# Patient Record
Sex: MALE | Race: WHITE | Employment: OTHER | URBAN - METROPOLITAN AREA
[De-identification: names, ages, dates, MRNs, and addresses within clinical notes are randomized per-mention and may not be internally consistent; named-entity substitution may affect disease eponyms.]

---

## 2017-04-19 PROBLEM — E78.2 MIXED HYPERLIPIDEMIA: Status: ACTIVE | Noted: 2017-04-19

## 2017-04-19 PROBLEM — I25.10 CORONARY ARTERY DISEASE INVOLVING NATIVE CORONARY ARTERY OF NATIVE HEART WITHOUT ANGINA PECTORIS: Status: ACTIVE | Noted: 2017-04-19

## 2017-04-19 PROBLEM — I10 ESSENTIAL HYPERTENSION: Status: ACTIVE | Noted: 2017-04-19

## 2017-10-11 PROBLEM — N52.9 ED (ERECTILE DYSFUNCTION): Status: ACTIVE | Noted: 2017-10-11

## 2019-11-20 PROBLEM — I65.22 STENOSIS OF LEFT CAROTID ARTERY: Status: ACTIVE | Noted: 2019-11-20

## 2020-01-15 ENCOUNTER — HOSPITAL ENCOUNTER (OUTPATIENT)
Dept: CV DIAGNOSTICS | Facility: HOSPITAL | Age: 70
Discharge: HOME | End: 2020-01-15
Attending: SURGERY
Payer: MEDICARE

## 2020-01-15 ENCOUNTER — HOSPITAL ENCOUNTER (OUTPATIENT)
Dept: LAB | Facility: HOSPITAL | Age: 70
Discharge: HOME | End: 2020-01-15
Attending: SURGERY
Payer: MEDICARE

## 2020-01-15 DIAGNOSIS — Z01.818 PRE-OP TESTING: ICD-10-CM

## 2020-01-15 DIAGNOSIS — I65.29 CAROTID STENOSIS: ICD-10-CM

## 2020-01-15 LAB
ALBUMIN SERPL-MCNC: 3.9 G/DL (ref 3.4–5)
ALBUMIN/GLOB SERPL: 1.2 {RATIO} (ref 1–2)
ALP LIVER SERPL-CCNC: 66 U/L (ref 45–117)
ALT SERPL-CCNC: 24 U/L (ref 16–61)
ANION GAP SERPL CALC-SCNC: 4 MMOL/L (ref 0–18)
ANTIBODY SCREEN: NEGATIVE
APTT PPP: 26.5 SECONDS (ref 25.4–36.1)
AST SERPL-CCNC: 18 U/L (ref 15–37)
ATRIAL RATE: 66 BPM
BASOPHILS # BLD AUTO: 0.05 X10(3) UL (ref 0–0.2)
BASOPHILS NFR BLD AUTO: 0.8 %
BILIRUB SERPL-MCNC: 0.8 MG/DL (ref 0.1–2)
BUN BLD-MCNC: 22 MG/DL (ref 7–18)
BUN/CREAT SERPL: 17.6 (ref 10–20)
CALCIUM BLD-MCNC: 9 MG/DL (ref 8.5–10.1)
CHLORIDE SERPL-SCNC: 108 MMOL/L (ref 98–112)
CO2 SERPL-SCNC: 29 MMOL/L (ref 21–32)
CREAT BLD-MCNC: 1.25 MG/DL (ref 0.7–1.3)
DEPRECATED RDW RBC AUTO: 42.9 FL (ref 35.1–46.3)
EOSINOPHIL # BLD AUTO: 0.2 X10(3) UL (ref 0–0.7)
EOSINOPHIL NFR BLD AUTO: 3.1 %
ERYTHROCYTE [DISTWIDTH] IN BLOOD BY AUTOMATED COUNT: 12.3 % (ref 11–15)
GLOBULIN PLAS-MCNC: 3.2 G/DL (ref 2.8–4.4)
GLUCOSE BLD-MCNC: 100 MG/DL (ref 70–99)
HCT VFR BLD AUTO: 40.1 % (ref 39–53)
HGB BLD-MCNC: 13.2 G/DL (ref 13–17.5)
IMM GRANULOCYTES # BLD AUTO: 0.02 X10(3) UL (ref 0–1)
IMM GRANULOCYTES NFR BLD: 0.3 %
INR BLD: 0.96 (ref 0.9–1.1)
LYMPHOCYTES # BLD AUTO: 0.96 X10(3) UL (ref 1–4)
LYMPHOCYTES NFR BLD AUTO: 15.1 %
M PROTEIN MFR SERPL ELPH: 7.1 G/DL (ref 6.4–8.2)
MCH RBC QN AUTO: 31.6 PG (ref 26–34)
MCHC RBC AUTO-ENTMCNC: 32.9 G/DL (ref 31–37)
MCV RBC AUTO: 95.9 FL (ref 80–100)
MONOCYTES # BLD AUTO: 0.48 X10(3) UL (ref 0.1–1)
MONOCYTES NFR BLD AUTO: 7.6 %
NEUTROPHILS # BLD AUTO: 4.64 X10 (3) UL (ref 1.5–7.7)
NEUTROPHILS # BLD AUTO: 4.64 X10(3) UL (ref 1.5–7.7)
NEUTROPHILS NFR BLD AUTO: 73.1 %
OSMOLALITY SERPL CALC.SUM OF ELEC: 295 MOSM/KG (ref 275–295)
P AXIS: 20 DEGREES
P-R INTERVAL: 146 MS
PATIENT FASTING Y/N/NP: NO
PLATELET # BLD AUTO: 190 10(3)UL (ref 150–450)
POTASSIUM SERPL-SCNC: 4.2 MMOL/L (ref 3.5–5.1)
PSA SERPL DL<=0.01 NG/ML-MCNC: 13.2 SECONDS (ref 12.5–14.7)
Q-T INTERVAL: 388 MS
QRS DURATION: 94 MS
QTC CALCULATION (BEZET): 406 MS
R AXIS: -7 DEGREES
RBC # BLD AUTO: 4.18 X10(6)UL (ref 3.8–5.8)
RH BLOOD TYPE: POSITIVE
SODIUM SERPL-SCNC: 141 MMOL/L (ref 136–145)
T AXIS: 19 DEGREES
VENTRICULAR RATE: 66 BPM
WBC # BLD AUTO: 6.4 X10(3) UL (ref 4–11)

## 2020-01-15 PROCEDURE — 85025 COMPLETE CBC W/AUTO DIFF WBC: CPT | Performed by: SURGERY

## 2020-01-15 PROCEDURE — 93005 ELECTROCARDIOGRAM TRACING: CPT

## 2020-01-15 PROCEDURE — 80053 COMPREHEN METABOLIC PANEL: CPT | Performed by: SURGERY

## 2020-01-15 PROCEDURE — 86900 BLOOD TYPING SEROLOGIC ABO: CPT | Performed by: SURGERY

## 2020-01-15 PROCEDURE — 36415 COLL VENOUS BLD VENIPUNCTURE: CPT | Performed by: SURGERY

## 2020-01-15 PROCEDURE — 85610 PROTHROMBIN TIME: CPT | Performed by: SURGERY

## 2020-01-15 PROCEDURE — 86901 BLOOD TYPING SEROLOGIC RH(D): CPT | Performed by: SURGERY

## 2020-01-15 PROCEDURE — 86850 RBC ANTIBODY SCREEN: CPT | Performed by: SURGERY

## 2020-01-15 PROCEDURE — 93010 ELECTROCARDIOGRAM REPORT: CPT | Performed by: INTERNAL MEDICINE

## 2020-01-15 PROCEDURE — 85730 THROMBOPLASTIN TIME PARTIAL: CPT | Performed by: SURGERY

## 2020-01-27 RX ORDER — CLOPIDOGREL BISULFATE 75 MG/1
75 TABLET ORAL DAILY
COMMUNITY
End: 2020-02-25

## 2020-01-28 ENCOUNTER — ANESTHESIA EVENT (OUTPATIENT)
Dept: CARDIAC SURGERY | Facility: HOSPITAL | Age: 70
DRG: 036 | End: 2020-01-28
Payer: MEDICARE

## 2020-01-29 ENCOUNTER — ANESTHESIA (OUTPATIENT)
Dept: CARDIAC SURGERY | Facility: HOSPITAL | Age: 70
DRG: 036 | End: 2020-01-29
Payer: MEDICARE

## 2020-01-29 ENCOUNTER — HOSPITAL ENCOUNTER (INPATIENT)
Facility: HOSPITAL | Age: 70
LOS: 1 days | Discharge: HOME | DRG: 036 | End: 2020-01-30
Attending: SURGERY | Admitting: SURGERY
Payer: MEDICARE

## 2020-01-29 PROBLEM — I65.22 LEFT CAROTID ARTERY STENOSIS: Status: ACTIVE | Noted: 2020-01-29

## 2020-01-29 LAB
APTT PPP: 38.8 SECONDS (ref 25.4–36.1)
DEPRECATED RDW RBC AUTO: 42.2 FL (ref 35.1–46.3)
ERYTHROCYTE [DISTWIDTH] IN BLOOD BY AUTOMATED COUNT: 12.1 % (ref 11–15)
GLUCOSE BLD-MCNC: 114 MG/DL (ref 70–99)
GLUCOSE BLD-MCNC: 118 MG/DL (ref 70–99)
HCT VFR BLD AUTO: 35.1 % (ref 39–53)
HGB BLD-MCNC: 11.7 G/DL (ref 13–17.5)
INR BLD: 1.09 (ref 0.9–1.1)
ISTAT ACTIVATED CLOTTING TIME: 120 SECONDS (ref 74–137)
ISTAT ACTIVATED CLOTTING TIME: 131 SECONDS (ref 74–137)
ISTAT ACTIVATED CLOTTING TIME: 285 SECONDS (ref 74–137)
ISTAT ACTIVATED CLOTTING TIME: 296 SECONDS (ref 74–137)
ISTAT BLOOD GAS BASE EXCESS: 0 MMOL/L (ref ?–30)
ISTAT BLOOD GAS HCO3: 25.7 MEQ/L (ref 22–26)
ISTAT BLOOD GAS O2 SATURATION: 98 % (ref 92–100)
ISTAT BLOOD GAS PCO2: 48.7 MMHG (ref 35–45)
ISTAT BLOOD GAS PH: 7.33 (ref 7.35–7.45)
ISTAT BLOOD GAS PO2: 106 MMHG (ref 80–105)
ISTAT BLOOD GAS TCO2: 27 MMOL/L (ref 22–32)
ISTAT HEMATOCRIT: 34 % (ref 37–53)
ISTAT IONIZED CALCIUM: 1.28 MMOL/L (ref 1.12–1.32)
ISTAT POTASSIUM: 4.2 MMOL/L (ref 3.6–5.1)
ISTAT SODIUM: 141 MMOL/L (ref 136–145)
MCH RBC QN AUTO: 31.9 PG (ref 26–34)
MCHC RBC AUTO-ENTMCNC: 33.3 G/DL (ref 31–37)
MCV RBC AUTO: 95.6 FL (ref 80–100)
PLATELET # BLD AUTO: 183 10(3)UL (ref 150–450)
PSA SERPL DL<=0.01 NG/ML-MCNC: 14.6 SECONDS (ref 12.5–14.7)
RBC # BLD AUTO: 3.67 X10(6)UL (ref 3.8–5.8)
WBC # BLD AUTO: 7.2 X10(3) UL (ref 4–11)

## 2020-01-29 PROCEDURE — 87081 CULTURE SCREEN ONLY: CPT | Performed by: SURGERY

## 2020-01-29 PROCEDURE — 85014 HEMATOCRIT: CPT

## 2020-01-29 PROCEDURE — 84295 ASSAY OF SERUM SODIUM: CPT

## 2020-01-29 PROCEDURE — 85347 COAGULATION TIME ACTIVATED: CPT

## 2020-01-29 PROCEDURE — 82330 ASSAY OF CALCIUM: CPT

## 2020-01-29 PROCEDURE — 86920 COMPATIBILITY TEST SPIN: CPT

## 2020-01-29 PROCEDURE — 82803 BLOOD GASES ANY COMBINATION: CPT

## 2020-01-29 PROCEDURE — 85730 THROMBOPLASTIN TIME PARTIAL: CPT | Performed by: SURGERY

## 2020-01-29 PROCEDURE — 84132 ASSAY OF SERUM POTASSIUM: CPT

## 2020-01-29 PROCEDURE — 037L3DZ DILATION OF LEFT INTERNAL CAROTID ARTERY WITH INTRALUMINAL DEVICE, PERCUTANEOUS APPROACH: ICD-10-PCS | Performed by: SURGERY

## 2020-01-29 PROCEDURE — 51701 INSERT BLADDER CATHETER: CPT

## 2020-01-29 PROCEDURE — 85027 COMPLETE CBC AUTOMATED: CPT | Performed by: SURGERY

## 2020-01-29 PROCEDURE — 85610 PROTHROMBIN TIME: CPT | Performed by: SURGERY

## 2020-01-29 PROCEDURE — 82962 GLUCOSE BLOOD TEST: CPT

## 2020-01-29 DEVICE — IMPLANTABLE DEVICE: Type: IMPLANTABLE DEVICE | Site: NECK | Status: FUNCTIONAL

## 2020-01-29 RX ORDER — SODIUM CHLORIDE, SODIUM LACTATE, POTASSIUM CHLORIDE, CALCIUM CHLORIDE 600; 310; 30; 20 MG/100ML; MG/100ML; MG/100ML; MG/100ML
INJECTION, SOLUTION INTRAVENOUS CONTINUOUS
Status: DISCONTINUED | OUTPATIENT
Start: 2020-01-29 | End: 2020-01-30

## 2020-01-29 RX ORDER — ASPIRIN 81 MG/1
81 TABLET ORAL DAILY
Status: DISCONTINUED | OUTPATIENT
Start: 2020-01-29 | End: 2020-01-29

## 2020-01-29 RX ORDER — SODIUM CHLORIDE 9 MG/ML
INJECTION, SOLUTION INTRAVENOUS CONTINUOUS PRN
Status: DISCONTINUED | OUTPATIENT
Start: 2020-01-29 | End: 2020-01-29 | Stop reason: SURG

## 2020-01-29 RX ORDER — DOCUSATE SODIUM 100 MG/1
100 CAPSULE, LIQUID FILLED ORAL 2 TIMES DAILY
Status: DISCONTINUED | OUTPATIENT
Start: 2020-01-29 | End: 2020-01-30

## 2020-01-29 RX ORDER — NALOXONE HYDROCHLORIDE 0.4 MG/ML
80 INJECTION, SOLUTION INTRAMUSCULAR; INTRAVENOUS; SUBCUTANEOUS AS NEEDED
Status: ACTIVE | OUTPATIENT
Start: 2020-01-29 | End: 2020-01-29

## 2020-01-29 RX ORDER — METOPROLOL SUCCINATE 50 MG/1
50 TABLET, EXTENDED RELEASE ORAL
Status: DISCONTINUED | OUTPATIENT
Start: 2020-01-29 | End: 2020-01-30

## 2020-01-29 RX ORDER — HEPARIN SODIUM 1000 [USP'U]/ML
INJECTION, SOLUTION INTRAVENOUS; SUBCUTANEOUS AS NEEDED
Status: DISCONTINUED | OUTPATIENT
Start: 2020-01-29 | End: 2020-01-29 | Stop reason: SURG

## 2020-01-29 RX ORDER — MIDAZOLAM HYDROCHLORIDE 1 MG/ML
INJECTION INTRAMUSCULAR; INTRAVENOUS AS NEEDED
Status: DISCONTINUED | OUTPATIENT
Start: 2020-01-29 | End: 2020-01-29 | Stop reason: SURG

## 2020-01-29 RX ORDER — PANTOPRAZOLE SODIUM 20 MG/1
20 TABLET, DELAYED RELEASE ORAL DAILY
Status: DISCONTINUED | OUTPATIENT
Start: 2020-01-29 | End: 2020-01-29 | Stop reason: SDUPTHER

## 2020-01-29 RX ORDER — CLOPIDOGREL BISULFATE 75 MG/1
75 TABLET ORAL DAILY
Status: DISCONTINUED | OUTPATIENT
Start: 2020-01-29 | End: 2020-01-30

## 2020-01-29 RX ORDER — CLOPIDOGREL BISULFATE 75 MG/1
75 TABLET ORAL DAILY
Status: DISCONTINUED | OUTPATIENT
Start: 2020-01-29 | End: 2020-01-29

## 2020-01-29 RX ORDER — METOCLOPRAMIDE HYDROCHLORIDE 5 MG/ML
INJECTION INTRAMUSCULAR; INTRAVENOUS AS NEEDED
Status: DISCONTINUED | OUTPATIENT
Start: 2020-01-29 | End: 2020-01-29 | Stop reason: SURG

## 2020-01-29 RX ORDER — LIDOCAINE HYDROCHLORIDE 10 MG/ML
INJECTION, SOLUTION INFILTRATION; PERINEURAL AS NEEDED
Status: DISCONTINUED | OUTPATIENT
Start: 2020-01-29 | End: 2020-01-29 | Stop reason: HOSPADM

## 2020-01-29 RX ORDER — VALSARTAN 40 MG/1
40 TABLET ORAL
Status: DISCONTINUED | OUTPATIENT
Start: 2020-01-29 | End: 2020-01-30

## 2020-01-29 RX ORDER — TAMSULOSIN HYDROCHLORIDE 0.4 MG/1
0.4 CAPSULE ORAL DAILY
Status: DISCONTINUED | OUTPATIENT
Start: 2020-01-29 | End: 2020-01-30

## 2020-01-29 RX ORDER — ASPIRIN 325 MG
325 TABLET ORAL DAILY
Status: DISCONTINUED | OUTPATIENT
Start: 2020-01-30 | End: 2020-01-30

## 2020-01-29 RX ORDER — GLYCOPYRROLATE 0.2 MG/ML
INJECTION, SOLUTION INTRAMUSCULAR; INTRAVENOUS AS NEEDED
Status: DISCONTINUED | OUTPATIENT
Start: 2020-01-29 | End: 2020-01-29 | Stop reason: SURG

## 2020-01-29 RX ORDER — ONDANSETRON 2 MG/ML
4 INJECTION INTRAMUSCULAR; INTRAVENOUS AS NEEDED
Status: ACTIVE | OUTPATIENT
Start: 2020-01-29 | End: 2020-01-29

## 2020-01-29 RX ORDER — POLYETHYLENE GLYCOL 3350 17 G/17G
17 POWDER, FOR SOLUTION ORAL DAILY PRN
Status: DISCONTINUED | OUTPATIENT
Start: 2020-01-29 | End: 2020-01-30

## 2020-01-29 RX ORDER — CEFAZOLIN SODIUM/WATER 2 G/20 ML
SYRINGE (ML) INTRAVENOUS AS NEEDED
Status: DISCONTINUED | OUTPATIENT
Start: 2020-01-29 | End: 2020-01-29 | Stop reason: SURG

## 2020-01-29 RX ORDER — BISACODYL 10 MG
10 SUPPOSITORY, RECTAL RECTAL
Status: DISCONTINUED | OUTPATIENT
Start: 2020-01-29 | End: 2020-01-30

## 2020-01-29 RX ORDER — CEFAZOLIN SODIUM/WATER 2 G/20 ML
2 SYRINGE (ML) INTRAVENOUS EVERY 8 HOURS
Status: COMPLETED | OUTPATIENT
Start: 2020-01-29 | End: 2020-01-30

## 2020-01-29 RX ORDER — HEPARIN SODIUM 5000 [USP'U]/ML
5000 INJECTION, SOLUTION INTRAVENOUS; SUBCUTANEOUS ONCE
Status: DISCONTINUED | OUTPATIENT
Start: 2020-01-29 | End: 2020-01-29 | Stop reason: HOSPADM

## 2020-01-29 RX ORDER — HYDROMORPHONE HYDROCHLORIDE 1 MG/ML
0.4 INJECTION, SOLUTION INTRAMUSCULAR; INTRAVENOUS; SUBCUTANEOUS EVERY 5 MIN PRN
Status: ACTIVE | OUTPATIENT
Start: 2020-01-29 | End: 2020-01-29

## 2020-01-29 RX ORDER — EZETIMIBE 10 MG/1
10 TABLET ORAL NIGHTLY
Status: DISCONTINUED | OUTPATIENT
Start: 2020-01-29 | End: 2020-01-30

## 2020-01-29 RX ORDER — OMEPRAZOLE 20 MG/1
20 CAPSULE, DELAYED RELEASE ORAL
Status: DISCONTINUED | OUTPATIENT
Start: 2020-01-29 | End: 2020-01-30

## 2020-01-29 RX ORDER — ATORVASTATIN CALCIUM 20 MG/1
20 TABLET, FILM COATED ORAL NIGHTLY
Status: DISCONTINUED | OUTPATIENT
Start: 2020-01-29 | End: 2020-01-30

## 2020-01-29 RX ORDER — BUPIVACAINE HYDROCHLORIDE 5 MG/ML
INJECTION, SOLUTION EPIDURAL; INTRACAUDAL AS NEEDED
Status: DISCONTINUED | OUTPATIENT
Start: 2020-01-29 | End: 2020-01-29 | Stop reason: HOSPADM

## 2020-01-29 RX ORDER — ONDANSETRON 2 MG/ML
4 INJECTION INTRAMUSCULAR; INTRAVENOUS EVERY 6 HOURS PRN
Status: DISCONTINUED | OUTPATIENT
Start: 2020-01-29 | End: 2020-01-30

## 2020-01-29 RX ADMIN — METOCLOPRAMIDE HYDROCHLORIDE 40 MG: 5 INJECTION INTRAMUSCULAR; INTRAVENOUS at 09:23:00

## 2020-01-29 RX ADMIN — MIDAZOLAM HYDROCHLORIDE 2 MG: 1 INJECTION INTRAMUSCULAR; INTRAVENOUS at 07:22:00

## 2020-01-29 RX ADMIN — CEFAZOLIN SODIUM/WATER 2 G: 2 G/20 ML SYRINGE (ML) INTRAVENOUS at 07:45:00

## 2020-01-29 RX ADMIN — GLYCOPYRROLATE 0.2 MG: 0.2 INJECTION, SOLUTION INTRAMUSCULAR; INTRAVENOUS at 08:58:00

## 2020-01-29 RX ADMIN — HEPARIN SODIUM 12000 UNITS: 1000 INJECTION, SOLUTION INTRAVENOUS; SUBCUTANEOUS at 08:19:00

## 2020-01-29 RX ADMIN — HEPARIN SODIUM 2000 UNITS: 1000 INJECTION, SOLUTION INTRAVENOUS; SUBCUTANEOUS at 08:31:00

## 2020-01-29 RX ADMIN — SODIUM CHLORIDE: 9 INJECTION, SOLUTION INTRAVENOUS at 07:35:00

## 2020-01-29 NOTE — ANESTHESIA PREPROCEDURE EVALUATION
PRE-OP EVALUATION    Patient Name: Dodie Flynn    Pre-op Diagnosis: left carotid stenosis    Procedure(s):  left transcarotid artery revascularization  8x30 and 8x40 stents  no SA needed    Surgeon(s) and Role:     Telly Silver MD - Primary neuro/psych ROS.                           Tonsil cancer       Past Surgical History:   Procedure Laterality Date   • ANGIOGRAM  04/07/2017    Xience stent placed RCA   • ANGIOPLASTY (CORONARY)  04/07/2017 & 2001    stent placement   • CATH DRUG ELUTING NIKHIL neck           ASA: 3   Plan: general  NPO status verified and patient meets guidelines. Post-procedure pain management plan discussed with surgeon and patient.       Plan/risks discussed with: patient, spouse and child/children            A line   piv x

## 2020-01-29 NOTE — ANESTHESIA PROCEDURE NOTES
Arterial Line  Performed by: Celestino Schulte MD  Authorized by: Celestino Schulte MD     General Information and Staff    Procedure Start:  1/29/2020 7:24 AM  Procedure End:  1/29/2020 8:29 AM  Anesthesiologist:  Celestino Schulte MD  Perfo

## 2020-01-29 NOTE — H&P
[facility]    PATIENT'S NAME: Tyra Keith   ATTENDING PHYSICIAN: Maria Ines Senior M.D.    PATIENT ACCOUNT#:   [de-identified]    [facility]  MEDICAL RECORD #:   [id number]       YOB: 1950  ADMISSION DATE: and lisinopril. SOCIAL HISTORY:  He has a history of tobacco abuse in the past, but quit in the 1980s. He is currently retired, used to work for [employer]. He denies any EtOH abuse or drug use. The patient is .   He has a total of 5 childre him an open repair which may be difficult. I discussed stenting from a femoral artery approach which also may be difficult and possibly not obtainable due to the coiling of the artery beyond.   I reviewed and discussed this case with Dr. Tiesha Stevens and offer

## 2020-01-29 NOTE — OPERATIVE REPORT
Pre-op Dx: Left carotid stenosis/ Radiation to neck. Post-op Dx: same with coiling of ICA distal to stenosis. Procedure: TCAR laft carotid with open CCA/ Percutaneous right CFV/ PTA left ICA? CCA- 4x40. Left ICA/CCA Stent Enroute- 8x40. Surgeon : Ethan Brewer.  Pro

## 2020-01-29 NOTE — CONSULTS
[facility] Cardiology  Consultation Note    Lucas Camacho Patient Status:  Inpatient    1950 MRN [id number]   [facility] 6NE-A Attending Jordy Crawford MD   Hosp Day # 0 PCP Darlin Mueller MD     Reason for consult: reports that he quit smoking about 32 years ago. He has a 20.00 pack-year smoking history. He has never used smokeless tobacco. He reports that he does not drink alcohol or use drugs.     Allergies:    Hydrochlorothiazide     ITCHING  Lisinopril murmur or gallops     Abdomen: soft, nontender, nondistended, intact bowel sounds  Extremities: no calf tenderness, no pedal edema, intact pulses  Neurologic: no focal neurological deficits  Musculoskeletal: moves all 4 extremities, gait deferred   Psychia

## 2020-01-29 NOTE — CONSULTS
[facility] Hospitalist History and Physical      CC: medical management sp carotid surgery    PCP: Matt Perdomo MD      History of Present Illness: Patient is a 68year old male with PMH sig for CAD, carotid stenosis, HTN, HL, tonisillar ca sp chemo/xrt pre Alcohol use: No       Fam Hx  Family History   Problem Relation Age of Onset   • Heart Surgery Father         CABG and B/L CEA   • Other (Other) Mother        Review of Systems  Comprehensive ROS reviewed and negative except for what is stated in HPI. patient while in house  A total of 75  minutes taken with patient and coordinating care. Greater than 50% face to face encounter.       Remy Yao MD  [facility] Hospitalist  [phone]    **Certification      PHYSICIAN Certification of Need for Inpatient H

## 2020-01-29 NOTE — ANESTHESIA PROCEDURE NOTES
Peripheral IV  Date/Time: 1/29/2020 7:35 AM  Inserted by: Markel Joy MD    Placement  Needle size: 16 G  Laterality: left  Location: hand  Local anesthetic: injectable  Site prep: alcohol  Technique: anatomical landmarks  Attempts: 1

## 2020-01-29 NOTE — ANESTHESIA POSTPROCEDURE EVALUATION
[address]  Patient Status:  Inpatient   Age/Gender 68year old male MRN [id number]   [facility] 6NE-A Attending Ely Thornton MD   Hosp Day # 0 PCP [address] Mcdonough MD       Anesthesia Post-op Note    Procedure(s)

## 2020-01-30 VITALS
RESPIRATION RATE: 15 BRPM | TEMPERATURE: 98 F | SYSTOLIC BLOOD PRESSURE: 110 MMHG | HEIGHT: 68 IN | BODY MASS INDEX: 35.05 KG/M2 | OXYGEN SATURATION: 100 % | DIASTOLIC BLOOD PRESSURE: 55 MMHG | HEART RATE: 66 BPM | WEIGHT: 231.25 LBS

## 2020-01-30 LAB
ANION GAP SERPL CALC-SCNC: 7 MMOL/L (ref 0–18)
BUN BLD-MCNC: 23 MG/DL (ref 7–18)
BUN/CREAT SERPL: 21.7 (ref 10–20)
CALCIUM BLD-MCNC: 8.6 MG/DL (ref 8.5–10.1)
CHLORIDE SERPL-SCNC: 110 MMOL/L (ref 98–112)
CO2 SERPL-SCNC: 26 MMOL/L (ref 21–32)
CREAT BLD-MCNC: 1.06 MG/DL (ref 0.7–1.3)
DEPRECATED RDW RBC AUTO: 42.5 FL (ref 35.1–46.3)
ERYTHROCYTE [DISTWIDTH] IN BLOOD BY AUTOMATED COUNT: 12.3 % (ref 11–15)
GLUCOSE BLD-MCNC: 120 MG/DL (ref 70–99)
HCT VFR BLD AUTO: 35.1 % (ref 39–53)
HGB BLD-MCNC: 11.6 G/DL (ref 13–17.5)
MCH RBC QN AUTO: 31.5 PG (ref 26–34)
MCHC RBC AUTO-ENTMCNC: 33 G/DL (ref 31–37)
MCV RBC AUTO: 95.4 FL (ref 80–100)
OSMOLALITY SERPL CALC.SUM OF ELEC: 301 MOSM/KG (ref 275–295)
PLATELET # BLD AUTO: 157 10(3)UL (ref 150–450)
POTASSIUM SERPL-SCNC: 4.5 MMOL/L (ref 3.5–5.1)
RBC # BLD AUTO: 3.68 X10(6)UL (ref 3.8–5.8)
SODIUM SERPL-SCNC: 143 MMOL/L (ref 136–145)
WBC # BLD AUTO: 7.5 X10(3) UL (ref 4–11)

## 2020-01-30 PROCEDURE — 85027 COMPLETE CBC AUTOMATED: CPT | Performed by: SURGERY

## 2020-01-30 PROCEDURE — 80048 BASIC METABOLIC PNL TOTAL CA: CPT | Performed by: SURGERY

## 2020-01-30 NOTE — PROGRESS NOTES
[facility] [address] [place] [address] Cardiology Progress Note        Rona Persaud Patient Status:  Inpatient    1950 MRN [id number]   [facility] 6NE-A Attending Mirella Aguirre MD   Hosp Day # 1 PCP Ursula Norwood 7.2 7.5   HGB 11.7* 11.6*   .0 157.0       Chem:  Recent Labs   Lab 01/30/20  0347      K 4.5      CO2 26.0   BUN 23*   CREATSERUM 1.06   CA 8.6   *       No results for input(s): ALT, AST, ALB, AMYLASE, LIPASE, LDH in the last 16

## 2020-01-30 NOTE — PLAN OF CARE
Assumed care of patient at 0700. Patient up in chair. A/O X 4. Neuros intact. NSR on monitor. Maintains sbp  per orders. Right groin site c/d/I, soft and free of hematoma. Pulses palpable. Neck incision with skin glue c/d/I., open to air.  Okay to d/c

## 2020-01-30 NOTE — PROGRESS NOTES
Vascular surgery progress note    Patient seen and examined this AM.  No overnight events. He denies any pain, numbness, tingling, weakness. His vitals and labs appear stable and within normal limits.   Lamination, his incision is clean, dry, intact with

## 2020-01-30 NOTE — CM/SW NOTE
01/30/20 1500   CM/SW Screening   Referral Source    Information Source Chart review   Patient's Mental Status Alert;Oriented   Patient lives with Spouse   Patient Status Prior to Admission   Independent with ADLs and Mobility Yes   [address]

## 2020-01-30 NOTE — PLAN OF CARE
Assumed care of pt at [facility]. Pt resting in bed, VSS. Right groin incision site soft, no hematoma, C/D/I. Left neck incision site C/D/I. Pt denies any pain/discomfort. Pt neurologically intact, up to bathroom with standby assist. Pt updated on plan of care.

## 2020-01-30 NOTE — OPERATIVE REPORT
[facility]    PATIENT'S NAME: Deysi Milner   ATTENDING PHYSICIAN: Gen Lobato M.D. OPERATING PHYSICIAN: Gen Lobato M.D.    PATIENT ACCOUNT#:   [de-identified]    LOCATION:  [address] [place] [address]  MEDICAL RECORD #:   [id number]       DATE OF BIRTH: thrombosis, bleeding, and infection. The case had been reviewed and discussed also with Dr. Marcel Ruvalcaba who saw the patient. Patient and wife understood and agreed. All questions answered.     OPERATIVE TECHNIQUE:  The patient was placed supine in the proce on the table ready to be used as well as the 0.014 wire as well as a 4 x 40 mm [place] balloon catheter.     The patient, after ACTs were monitored to make sure they were appropriate, had puncture of the left common carotid artery at the base with a micropunct proper lesion spot. The reversal was stopped during the injection and immediately restarted afterward.     The 0.014 wire was placed and went through as a TCAR guidewire 95 cm, 0.014, was placed under fluoroscopy and was marked past the area of the stenosi for hemostasis. The right femoral vein access was removed with pressure applied and applied for at least 10 minutes with good hemostasis obtained. The patient had reversal of the anticoagulation with 40 mg slowly of protamine.   Neurologically, the patien balloon, and then placement of an 8 x 40 mm TCAR stent with angiograms of the left carotid artery. Dictated By Lacey Palomo M.D.  d: 01/29/2020 10:32:37  t: 01/29/2020 11:38:20  [facility] [id number]  [facility]/    cc: Lacey Palomo M.D. Darlene \[facility] [place] [facility]\""

## 2020-01-31 LAB — BLOOD TYPE BARCODE: 5100

## 2020-01-31 NOTE — PROGRESS NOTES
[facility] Hospitalist note    PCP: Juliana Guaman MD    Chief Complaint:  F/u carotid artery stenosis s/p L transcarotid revascularization and stent    SUBJECTIVE:  Pt feeling very well. Ambulating. No sob. No sig pain. OBJECTIVE:  Temp:  [97.8 °F (36.

## 2020-02-04 NOTE — CONSULTS
Vascular Surgery  New Patient Consultation    Name: Monalisa Goldberg  MRN: [id number]  : 1950    Referring Provider: No ref.  provider found    CC: left carotid stenosis    HPI: 77-year-old male with history of hypertension, hyperlipidemia, corona • TRANSCAROTID ARTERY REVASCULARIZATION Left 1/29/2020    Performed by Liana Dc MD at [facility] CVOR         Hydrochlorothiazide     ITCHING  Lisinopril              OTHER (SEE COMMENTS)    Comment:Sensitive to light  Losartan                SWELLING Constitutional: No fevers, chills, fatigue or night sweats. ENT: No mouth pain, neck pain, running nose, headaches or swollen glands. Skin: No rashes, pruritus or skin changes,  Respiratory: Denies cough, wheezing or shortness of breath.   CV: Denies ches Imaging: CTA: Imaging reviewed. See final report for further details.   He has a high-grade left carotid artery stenosis that is approximately an 90% stenosis that is focal.  This is within the internal carotid artery approximately 3 cm distal to the bifur

## 2020-02-19 DIAGNOSIS — Z98.890 HISTORY OF COMMON CAROTID ARTERY STENT PLACEMENT: Primary | ICD-10-CM

## 2020-02-19 DIAGNOSIS — C09.9 TONSILLAR CANCER (CMD): ICD-10-CM

## 2020-02-19 DIAGNOSIS — Z95.828 HISTORY OF COMMON CAROTID ARTERY STENT PLACEMENT: Primary | ICD-10-CM

## 2020-02-21 ENCOUNTER — HOSPITAL ENCOUNTER (OUTPATIENT)
Dept: CARDIOLOGY CLINIC | Facility: HOSPITAL | Age: 70
Discharge: HOME | End: 2020-02-21
Attending: SURGERY
Payer: MEDICARE

## 2020-02-21 DIAGNOSIS — Z98.890 HISTORY OF COMMON CAROTID ARTERY STENT PLACEMENT: ICD-10-CM

## 2020-02-21 DIAGNOSIS — C09.9 TONSILLAR CANCER (HCC): ICD-10-CM

## 2020-02-21 DIAGNOSIS — Z95.828 HISTORY OF COMMON CAROTID ARTERY STENT PLACEMENT: ICD-10-CM

## 2020-02-21 PROCEDURE — 93306 TTE W/DOPPLER COMPLETE: CPT | Performed by: INTERNAL MEDICINE

## (undated) DEVICE — CHLORAPREP 26ML APPLICATOR

## (undated) DEVICE — FLOSEAL HEMOSTATIC MATRIX, 5ML: Brand: FLOSEAL HEMOSTATIC MATRIX

## (undated) DEVICE — INTRODUCER TCAR 21G/5F

## (undated) DEVICE — CV PACK-LF: Brand: MEDLINE INDUSTRIES, INC.

## (undated) DEVICE — SUTURE SILK 2-0 SH

## (undated) DEVICE — Device

## (undated) DEVICE — HEMOCLIP HORIZON SM MULTI

## (undated) DEVICE — SKIN AFFIX .4ML

## (undated) DEVICE — STERILE POLYISOPRENE POWDER-FREE SURGICAL GLOVES: Brand: PROTEXIS

## (undated) DEVICE — HEMOCLIP MED 24 CLIP/CARTRIDGE

## (undated) DEVICE — SUTURE SILK 3-0

## (undated) DEVICE — STANDARD HYPODERMIC NEEDLE,POLYPROPYLENE HUB: Brand: MONOJECT

## (undated) DEVICE — GUIDE WIRE TCAR ENROUTE

## (undated) DEVICE — DECANTER BAG 9": Brand: MEDLINE INDUSTRIES, INC.

## (undated) DEVICE — SOL  .9 1000ML BTL

## (undated) DEVICE — X-RAY DETECTABLE SPONGES,16 PLY: Brand: VISTEC

## (undated) DEVICE — SUTURE MONOCRYL 4-0 PS-2

## (undated) DEVICE — SUTURE VICRYL 2-0 CT-1

## (undated) DEVICE — SUTURE SILK 2-0

## (undated) DEVICE — ANGLED-TIP ARTERIAL SHEATH CONFIGURATION: Brand: ENROUTE TRANSCAROTID NEUROPROTECTION SYSTEM

## (undated) DEVICE — SYRINGE 10ML LL TIP

## (undated) DEVICE — SUTURE VICRYL 3-0 SH

## (undated) DEVICE — SUTURE PROLENE 5-0 C-1

## (undated) DEVICE — TRANSPOSAL ULTRAFLEX DUO/QUAD ULTRA CART MANIFOLD

## (undated) DEVICE — SUTURE VICRYL 3-0 PS-1

## (undated) DEVICE — BASIC DOUBLE BASIN 1-LF: Brand: MEDLINE INDUSTRIES, INC.

## (undated) NOTE — LETTER
Judi Guzman M.D., F.A.C.S. Pedro Luis Apple M.D., F.A.C.S. Cady Uriostegui M.D., Herman Eli. KEVEN Manuel M.D., F.A.C.S. Rm Love. Tahria Joseph M.D., F.A.C.S. Jonny Valencia M.D. JOSÉ [place] M.D., FEvangelist chance to have all of your questions and concerns answered. If there are any issues which have not been adequately addressed, we ask you to bring them forward so that we can thoroughly address them.     A patient who is fully informed and understands their treatment, among other options and the risks and benefits of the different treatment options:    Yes _____ No _____    A [facility] surgeon as explained to me that if I should so desire, he/she is willing to explain my case and the surgical and non-surgical optio

## (undated) NOTE — LETTER
[address] [address]  [place], [address] [facility]    Consent for Operation  Date: __________________                                Time: _______________    1.  I authorize the performance upon Doris Santos the following operation:    2. le procedure has been videotaped, the surgeon will obtain the original videotape. The hospital will not be responsible for storage or maintenance of this tape.   7. For the purpose of advancing medical education, I consent to the admittance of observers to the STATEMENTS REQUIRING INSERTION OR COMPLETION WERE FILLED IN.     Signature of Patient:   ___________________________    When the patient is a minor or mentally incompetent to give consent:  Signature of person authorized to consent for patient: ____________ supplements, and pills I can buy without a prescription (including street drugs/illegal medications). Failure to inform my anesthesiologist about these medicines may increase my risk of anesthetic complications. iv.  If I am allergic to anything or have ha Anesthesiologist Signature     Date   Time  I have discussed the procedure and information above with the patient (or patient’s representative) and answered their questions. The patient or their representative has agreed to have anesthesia services.     ___

## (undated) NOTE — LETTER
[address]  [place], [address] [place] [address]    Consent for Operation  Date: __________________                                Time: _______________    1.  I authorize the performance upon Samy Cronin the following operation:  Procedu procedure has been videotaped, the surgeon will obtain the original videotape. The hospital will not be responsible for storage or maintenance of this tape.   7. For the purpose of advancing medical education, I consent to the admittance of observers to the STATEMENTS REQUIRING INSERTION OR COMPLETION WERE FILLED IN.     Signature of Patient:   ___________________________    When the patient is a minor or mentally incompetent to give consent:  Signature of person authorized to consent for patient: ____________ supplements, and pills I can buy without a prescription (including street drugs/illegal medications). Failure to inform my anesthesiologist about these medicines may increase my risk of anesthetic complications. iv.  If I am allergic to anything or have ha Anesthesiologist Signature     Date   Time  I have discussed the procedure and information above with the patient (or patient’s representative) and answered their questions. The patient or their representative has agreed to have anesthesia services.     ___

## (undated) NOTE — LETTER
[address] [facility] FOR BLOOD OR BLOOD COMPONENTS      In the course of your treatment, it may become necessary to administer a transfusion of blood or blood components.  This form provides basic information concerning this proc explain the alternatives to you if it has not already been done. I,Dejon Vigil, have read/had read to me the above. I understand the matters bearing on the decision whether or not to authorize a transfusion of blood or blood components.  I have no qu

## (undated) NOTE — LETTER
[facility] FOR BLOOD OR BLOOD COMPONENTS      In the course of your treatment, it may become necessary to administer a transfusion of blood or blood components.  This form provides basic information concerning this proc explain the alternatives to you if it has not already been done. I,Dejon Vigil, have read/had read to me the above. I understand the matters bearing on the decision whether or not to authorize a transfusion of blood or blood components.  I have no qu

## (undated) NOTE — LETTER
Shivani Damon M.D., F.A.C.S. Carlyn Clement M.D., F.A.C.S. Devaughn Matson M.D., Veto Andrade. KEVEN Luo M.D., F.A.C.SEvangelist Veras. Negra Luther M.D., F.A.C.S. SOHA Villarreal M. Otelia Skill A.D. Ardelia Keels, M.D., FEvangelist chance to have all of your questions and concerns answered. If there are any issues which have not been adequately addressed, we ask you to bring them forward so that we can thoroughly address them.     A patient who is fully informed and understands their treatment, among other options and the risks and benefits of the different treatment options:    Yes _____ No _____    A [facility] surgeon as explained to me that if I should so desire, he/she is willing to explain my case and the surgical and non-surgical optio

## (undated) NOTE — LETTER
[place] [address]  [place], [address] [facility]    Consent for Operation  Date: __________________                                Time: _______________    1.  I authorize the performance upon [employer] the following operation:  Procedu procedure has been videotaped, the surgeon will obtain the original videotape. The hospital will not be responsible for storage or maintenance of this tape.   7. For the purpose of advancing medical education, I consent to the admittance of observers to the STATEMENTS REQUIRING INSERTION OR COMPLETION WERE FILLED IN.     Signature of Patient:   ___________________________    When the patient is a minor or mentally incompetent to give consent:  Signature of person authorized to consent for patient: ____________ supplements, and pills I can buy without a prescription (including street drugs/illegal medications). Failure to inform my anesthesiologist about these medicines may increase my risk of anesthetic complications. iv.  If I am allergic to anything or have ha Anesthesiologist Signature     Date   Time  I have discussed the procedure and information above with the patient (or patient’s representative) and answered their questions. The patient or their representative has agreed to have anesthesia services.     ___